# Patient Record
Sex: MALE | Race: WHITE
[De-identification: names, ages, dates, MRNs, and addresses within clinical notes are randomized per-mention and may not be internally consistent; named-entity substitution may affect disease eponyms.]

---

## 2018-02-02 ENCOUNTER — HOSPITAL ENCOUNTER (OUTPATIENT)
Dept: HOSPITAL 88 - RAD | Age: 78
End: 2018-02-02
Attending: INTERNAL MEDICINE
Payer: MEDICARE

## 2018-02-02 DIAGNOSIS — I82.409: Primary | ICD-10-CM

## 2018-02-02 PROCEDURE — 93970 EXTREMITY STUDY: CPT

## 2019-07-15 ENCOUNTER — HOSPITAL ENCOUNTER (OUTPATIENT)
Dept: HOSPITAL 88 - CT | Age: 79
End: 2019-07-15
Attending: INTERNAL MEDICINE
Payer: MEDICARE

## 2019-07-15 ENCOUNTER — HOSPITAL ENCOUNTER (EMERGENCY)
Dept: HOSPITAL 88 - ER | Age: 79
Discharge: OTHER INSTITUTION | End: 2019-07-15
Payer: MEDICARE

## 2019-07-15 VITALS — HEIGHT: 67 IN | BODY MASS INDEX: 32.33 KG/M2 | WEIGHT: 206 LBS

## 2019-07-15 DIAGNOSIS — G20: ICD-10-CM

## 2019-07-15 DIAGNOSIS — E11.9: ICD-10-CM

## 2019-07-15 DIAGNOSIS — R42: ICD-10-CM

## 2019-07-15 DIAGNOSIS — S06.5X0A: Primary | ICD-10-CM

## 2019-07-15 DIAGNOSIS — F41.9: ICD-10-CM

## 2019-07-15 DIAGNOSIS — Z86.73: ICD-10-CM

## 2019-07-15 DIAGNOSIS — G20: Primary | ICD-10-CM
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
## 2019-07-15 DIAGNOSIS — I10: ICD-10-CM

## 2019-07-15 DIAGNOSIS — F02.80: ICD-10-CM
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
## 2019-07-15 DIAGNOSIS — Z95.810: ICD-10-CM
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
## 2019-07-15 DIAGNOSIS — R41.3: ICD-10-CM

## 2019-07-15 LAB
ALBUMIN SERPL-MCNC: 3.9 G/DL (ref 3.5–5)
ALBUMIN/GLOB SERPL: 1.3 {RATIO} (ref 0.8–2)
ALP SERPL-CCNC: 88 IU/L (ref 40–150)
ALT SERPL-CCNC: 10 IU/L (ref 0–55)
ANION GAP SERPL CALC-SCNC: 12 MMOL/L (ref 8–16)
BASOPHILS # BLD AUTO: 0 10*3/UL (ref 0–0.1)
BASOPHILS NFR BLD AUTO: 0.4 % (ref 0–1)
BUN SERPL-MCNC: 15 MG/DL (ref 7–26)
BUN/CREAT SERPL: 17 (ref 6–25)
CALCIUM SERPL-MCNC: 9.8 MG/DL (ref 8.4–10.2)
CHLORIDE SERPL-SCNC: 103 MMOL/L (ref 98–107)
CK MB SERPL-MCNC: 1.7 NG/ML (ref 0–5)
CK SERPL-CCNC: 35 IU/L (ref 30–200)
CO2 SERPL-SCNC: 30 MMOL/L (ref 22–29)
DEPRECATED APTT PLAS QN: 37.6 SECONDS (ref 23.8–35.5)
DEPRECATED INR PLAS: 1.96
DEPRECATED NEUTROPHILS # BLD AUTO: 7.1 10*3/UL (ref 2.1–6.9)
EGFRCR SERPLBLD CKD-EPI 2021: > 60 ML/MIN (ref 60–?)
EOSINOPHIL # BLD AUTO: 0.1 10*3/UL (ref 0–0.4)
EOSINOPHIL NFR BLD AUTO: 1.6 % (ref 0–6)
ERYTHROCYTE [DISTWIDTH] IN CORD BLOOD: 12.4 % (ref 11.7–14.4)
GLOBULIN PLAS-MCNC: 3 G/DL (ref 2.3–3.5)
GLUCOSE SERPLBLD-MCNC: 102 MG/DL (ref 74–118)
HCT VFR BLD AUTO: 38.6 % (ref 38.2–49.6)
HGB BLD-MCNC: 13 G/DL (ref 14–18)
LYMPHOCYTES # BLD: 1.1 10*3/UL (ref 1–3.2)
LYMPHOCYTES NFR BLD AUTO: 12.3 % (ref 18–39.1)
MCH RBC QN AUTO: 33 PG (ref 28–32)
MCHC RBC AUTO-ENTMCNC: 33.7 G/DL (ref 31–35)
MCV RBC AUTO: 98 FL (ref 81–99)
MONOCYTES # BLD AUTO: 0.5 10*3/UL (ref 0.2–0.8)
MONOCYTES NFR BLD AUTO: 5.9 % (ref 4.4–11.3)
NEUTS SEG NFR BLD AUTO: 79.1 % (ref 38.7–80)
PLATELET # BLD AUTO: 88 X10E3/UL (ref 140–360)
POTASSIUM SERPL-SCNC: 5 MMOL/L (ref 3.5–5.1)
PROTHROMBIN TIME: 23 SECONDS (ref 11.9–14.5)
RBC # BLD AUTO: 3.94 X10E6/UL (ref 4.3–5.7)
SODIUM SERPL-SCNC: 140 MMOL/L (ref 136–145)

## 2019-07-15 PROCEDURE — 84484 ASSAY OF TROPONIN QUANT: CPT

## 2019-07-15 PROCEDURE — 86850 RBC ANTIBODY SCREEN: CPT

## 2019-07-15 PROCEDURE — 85025 COMPLETE CBC W/AUTO DIFF WBC: CPT

## 2019-07-15 PROCEDURE — 70450 CT HEAD/BRAIN W/O DYE: CPT

## 2019-07-15 PROCEDURE — 82553 CREATINE MB FRACTION: CPT

## 2019-07-15 PROCEDURE — 99284 EMERGENCY DEPT VISIT MOD MDM: CPT

## 2019-07-15 PROCEDURE — 82550 ASSAY OF CK (CPK): CPT

## 2019-07-15 PROCEDURE — 85610 PROTHROMBIN TIME: CPT

## 2019-07-15 PROCEDURE — 80053 COMPREHEN METABOLIC PANEL: CPT

## 2019-07-15 PROCEDURE — 36415 COLL VENOUS BLD VENIPUNCTURE: CPT

## 2019-07-15 PROCEDURE — 85730 THROMBOPLASTIN TIME PARTIAL: CPT

## 2019-07-15 PROCEDURE — 86900 BLOOD TYPING SEROLOGIC ABO: CPT

## 2019-07-15 NOTE — NUR
NOTIFIED TRANSFER CENTER FOR UPDATE ON STATUS OF TRANSFER. SPOKE WITH 
FAYE FROM [facility] TRANSFER [facility], WAITING ON 
BED TO COME AVAILABLE AT THIS TIME.

## 2019-07-15 NOTE — NUR
SPOKE WITH DIPIKA MCKINNEY [facility] TRANSFER [facility], 
ASKED IF PATIENT CAN BE TRANSFERRED ER TO ER INSTEAD OF AWAITING ON NEURO ICU 
BED TO COME AVAILABLE, STATES SHE WILL TALK WITH HOUSE SUPERVISOR AND WILL 
NOTIFY [facility] CHARGE NUSRE WITH DECISION.

## 2019-07-15 NOTE — DIAGNOSTIC IMAGING REPORT
Exam: Head CT without contrast

History:  TIA, Parkinson's, dimension, CVA, memory loss

Comparison studies:  None



Technique:

Axial images were obtained from the skull base to the vertex.

Coronal and sagittal images reconstructed from the axial data.  Dose

modulation, iterative reconstruction, and/or weight based adjustment of the

mA/kV was utilized to reduce the radiation dose to as low as reasonably

achievable.  



Radiation dose: 

Total DLP: 1036 mGy*cm. 

Estimated effective dose: DLP x 0.015 

Intravenous contrast: None



Findings:



Scalp: No abnormalities.

Bones: No fractures, blastic or lytic lesions.



Brain sulci: Mildly prominent.

Ventricles: Mild compensatory dilatation. No hydrocephalus.

Extra-axial spaces: Right hemispheric cyst evolving subacute subdural hematoma,

isodense to gray matter) with regional mass effect results in approximately 2

mm right to left midline shift. No uncal herniation. 



Parenchyma: 

No mass, acute hemorrhage or acute or chronic cortical insults. A few scattered

subtle hypodensities in the supratentorial white matter are nonspecific most

compatible with chronic microvascular ischemic changes. There is mild

generalized brain volume loss. No significant focal disproportionate lobar,

hippocampal, brainstem or cerebellar atrophy.



Sellar/suprasellar region: No abnormalities.

Craniocervical junction: Patent foramen magnum. No Chiari one malformation.



Incidental findings: 

Atherosclerotic calcifications in the carotid siphons.



IMPRESSION:

 

1.  Subacute 9 mm thick right hemispheric subdural hematoma results in 2 mm

right to left midline shift. No uncal herniation. No acute intracranial

hemorrhage.

2.  Mild generalized brain volume loss.

3.  Minimal chronic microvascular ischemic changes.



Findings were discussed with Dr. Noble's medical assistant, at 8:51 AM on

7/15/2019.



Signed by: Dr. Kevin Velasquez M.D. on 7/15/2019 8:54 AM

## 2019-07-15 NOTE — XMS REPORT
Patient Summary Document

                             Created on: 07/15/2019



REMINGTON ROMAN

External Reference #: [id number]

: 1940

Sex: Male



Demographics







                          Address                   [address] [place]  [address]

 

                          Home Phone                [phone]

 

                          Preferred Language        Unknown

 

                          Marital Status            Unknown

 

                          [employer] Affiliation     Unknown

 

                          Race                      Unknown

 

                                        Additional Race(s)  

 

                          Ethnic Group              Unknown





Author







                          Author                    [facility]

 

                          Address                   Unknown

 

                          Phone                     Unavailable







Care Team Providers







                    Care Team Member Name    Role                Phone

 

                    CARINE NOBLE      Unavailable         Unavailable







Problems

This patient has no known problems.



Allergies, Adverse Reactions, Alerts

This patient has no known allergies or adverse reactions.



Medications

This patient has no known medications.



Results







           Test Description    Test Time    Test Comments    Text Results    Atomic Results    Result

 Comments

 

                CT BRAIN WO     2019-07-15 08:41:00                                                               

                                           [place] [address]    
 Patient Name: REMINGTON ROMAN                                   MR #: 
[id number]                     : 1940                                  
Age/Sex: 78/M  Acct #: [id number]                              Req #: [id number]
[id number]  Adm Physician:                                                      
Ordered by: MATA NOBLE MD                            Report #: [id number]    
   Location: CT                                      Room/Bed:                  
  
___________________________________________________________________________________________________
   Procedure: [id number] CT/CT BRAIN WO  Exam Date: 07/15/19                     
      Exam Time: 815                                              REPORT 
STATUS: Signed    Exam: Head CT without contrast   History:  TIA, Parkinson's, 
dimension, CVA, memory loss   Comparison studies:  None      Technique:   Axial 
images were obtained from the skull base to the vertex.   Coronal and sagittal 
images reconstructed from the axial data.  Dose   modulation, iterative 
reconstruction, and/or weight based adjustment of the   mA/kV was utilized to 
reduce the radiation dose to as low as reasonably   achievable.        Radiation
dose:    Total DLP: 1036 mGy*cm.    Estimated effective dose: DLP x 0.015    
Intravenous contrast: None      Findings:      Scalp: No abnormalities.   Bones:
No fractures, blastic or lytic lesions.      Brain sulci: Mildly prominent.   
Ventricles: Mild compensatory dilatation. No hydrocephalus.   Extra-axial 
spaces: Right hemispheric cyst evolving subacute subdural hematoma,   isodense 
to gray matter) with regional mass effect results in approximately 2   mm right 
to left midline shift. No uncal herniation.       Parenchyma:    No mass, acute 
hemorrhage or acute or chronic cortical insults. A few scattered   subtle 
hypodensities in the supratentorial white matter are nonspecific most   
compatible with chronic microvascular ischemic changes. There is mild   
generalized brain volume loss. No significant focal disproportionate lobar,   
hippocampal, brainstem or cerebellar atrophy.      Sellar/suprasellar region: No
abnormalities.   Craniocervical junction: Patent foramen magnum. No Chiari one 
malformation.      Incidental findings:    Atherosclerotic calcifications in the
carotid siphons.      IMPRESSION:       1.  Subacute 9 mm thick right 
hemispheric subdural hematoma results in 2 mm   right to left midline shift. No 
uncal herniation. No acute intracranial   hemorrhage.   2.  Mild generalized 
brain volume loss.   3.  Minimal chronic microvascular ischemic changes.      
Findings were discussed with Dr. Noble's medical assistant, at 8:51 AM on   
7/15/2019.      Signed by: Dr. Ranjit Velasquez M.D. on 7/15/2019 8:54 AM        
Dictated By: RANJIT VELASQUEZ MD  Electronically Signed By: RANJIT VELASQUEZ MD on
07/15/19 0854  Transcribed By: YON on 07/15/19 0854       COPY TO:   
MATA NOBLE MD

## 2019-07-15 NOTE — NUR
TOMÁS HEATH ,NP AT BEDSIDE FOR PATIENT EVAL AND DISCUSSING THE CURRENT PLAN 
OF CARE WITH PATIENT AND FAMILY,VERBALIZED UNDERSTANDING.

## 2019-07-15 NOTE — NUR
TRANSFER ACCEPTED:

BED: [address] [place] [address] BED #4

ACCEPTING DOCTOR: DR DEBORAH LOWE @ [phone]

ADMIN APPROVAL: FAYE GUSMAN @ [id number]

## 2019-07-15 NOTE — NUR
INITIATED TRANSFER TO [facility] FOR HIGHER LEVEL OF CARE, 
SPOKE WITH GILBERTO GUSMAN.